# Patient Record
Sex: MALE | Race: WHITE
[De-identification: names, ages, dates, MRNs, and addresses within clinical notes are randomized per-mention and may not be internally consistent; named-entity substitution may affect disease eponyms.]

---

## 2021-07-16 ENCOUNTER — HOSPITAL ENCOUNTER (EMERGENCY)
Dept: HOSPITAL 7 - FB.ED | Age: 76
Discharge: NURSING, REHAB OR LONG-TERM CARE | End: 2021-07-16
Payer: OTHER GOVERNMENT

## 2021-07-16 DIAGNOSIS — J18.9: ICD-10-CM

## 2021-07-16 DIAGNOSIS — A41.9: Primary | ICD-10-CM

## 2021-07-16 DIAGNOSIS — I25.10: ICD-10-CM

## 2021-07-16 DIAGNOSIS — E11.9: ICD-10-CM

## 2021-07-16 DIAGNOSIS — Z79.84: ICD-10-CM

## 2021-07-16 DIAGNOSIS — N17.9: ICD-10-CM

## 2021-07-16 LAB
HCO3 BLDV-SCNC: 27 MMOL/L (ref 21–29)
PCO2 BLDV: 52 MMHG (ref 41–51)
PH BLDV: 7.32 PH UNITS (ref 7.32–7.43)

## 2021-07-16 PROCEDURE — U0002 COVID-19 LAB TEST NON-CDC: HCPCS

## 2021-07-16 PROCEDURE — 96375 TX/PRO/DX INJ NEW DRUG ADDON: CPT

## 2021-07-16 PROCEDURE — 96365 THER/PROPH/DIAG IV INF INIT: CPT

## 2021-07-16 PROCEDURE — 85025 COMPLETE CBC W/AUTO DIFF WBC: CPT

## 2021-07-16 PROCEDURE — 87040 BLOOD CULTURE FOR BACTERIA: CPT

## 2021-07-16 PROCEDURE — 51702 INSERT TEMP BLADDER CATH: CPT

## 2021-07-16 PROCEDURE — 83605 ASSAY OF LACTIC ACID: CPT

## 2021-07-16 PROCEDURE — 93005 ELECTROCARDIOGRAM TRACING: CPT

## 2021-07-16 PROCEDURE — 86140 C-REACTIVE PROTEIN: CPT

## 2021-07-16 PROCEDURE — 99285 EMERGENCY DEPT VISIT HI MDM: CPT

## 2021-07-16 PROCEDURE — 80053 COMPREHEN METABOLIC PANEL: CPT

## 2021-07-16 PROCEDURE — 71045 X-RAY EXAM CHEST 1 VIEW: CPT

## 2021-07-16 PROCEDURE — 36415 COLL VENOUS BLD VENIPUNCTURE: CPT

## 2021-07-16 PROCEDURE — 87635 SARS-COV-2 COVID-19 AMP PRB: CPT

## 2021-07-16 PROCEDURE — 83880 ASSAY OF NATRIURETIC PEPTIDE: CPT

## 2021-07-16 PROCEDURE — 82947 ASSAY GLUCOSE BLOOD QUANT: CPT

## 2021-07-16 PROCEDURE — 84484 ASSAY OF TROPONIN QUANT: CPT

## 2021-07-16 PROCEDURE — 81001 URINALYSIS AUTO W/SCOPE: CPT

## 2021-07-16 NOTE — EDM.PDOC
ED HPI GENERAL MEDICAL PROBLEM





- General


Stated Complaint: DIFFICULTY BREATHING


Time Seen by Provider: 07/16/21 15:25


Source of Information: Reports: Patient, Family


History Limitations: Reports: Altered Mental Status





- History of Present Illness


INITIAL COMMENTS - FREE TEXT/NARRATIVE: 





Patient is a 74 YO WM who presented to the ED via EMS because of coughing, 

dyspnea and weakness. He has been coughing for 1 month now which is productive 

of whitish phlegm. The dyspnea was just for the past 3-4 days and is getting 

worse. He was hypoxemic  with O2S of 87% on RA. He denies having any chest pain,

nausea, vomiting. There is no fever, chills, changes in bowel movements or 

urinary symptoms.





- Related Data


                                    Allergies











Allergy/AdvReac Type Severity Reaction Status Date / Time


 


No Known Allergies Allergy   Verified 07/16/21 17:33











Home Meds: 


                                    Home Meds





glipiZIDE [Glucotrol XL] 1 tab PO DAILY 07/16/21 [History]


metFORMIN [Glucophage] 1 tab PO DAILY 07/16/21 [History]











ED ROS GENERAL





- Review of Systems


Review Of Systems: See Below


Constitutional: Reports: Weakness


HEENT: Reports: No Symptoms


Respiratory: Reports: Shortness of Breath, Cough


Cardiovascular: Reports: No Symptoms, Chest Pain


Endocrine: Reports: Low Glucose


GI/Abdominal: Reports: No Symptoms


: Reports: No Symptoms


Musculoskeletal: Reports: No Symptoms


Skin: Reports: No Symptoms


Neurological: Reports: Confusion


Psychiatric: Reports: No Symptoms


Hematologic/Lymphatic: Reports: No Symptoms





ED EXAM, GENERAL





- Physical Exam


Exam: See Below


Exam Limited By: No Limitations


General Appearance: Other (comfused)


Ears: Normal External Exam, Normal Canal


Nose: Normal Inspection, Normal Mucosa, No Blood


Throat/Mouth: Normal Inspection, Normal Lips, Normal Teeth


Head: Atraumatic, Normocephalic


Neck: Normal Inspection, Supple, Non-Tender, Full Range of Motion


Respiratory/Chest: Decreased Breath Sounds, Rhonchi


Cardiovascular: Normal Peripheral Pulses, Regular Rate, Rhythm, No Edema, No 

JVD, No Murmur


GI/Abdominal: Normal Bowel Sounds, Soft, Non-Tender, No Organomegaly


Extremities: Normal Inspection, Normal Range of Motion, Non-Tender, No Pedal 

Edema


Neurological: Alert, Oriented, CN II-XII Intact, Normal Cognition


Skin Exam: Warm


  ** #1 Interpretation


EKG Date: 07/16/21


Time: 15:24


Rhythm: NSR


Rate (Beats/Min): 96


Axis: Normal


P-Wave: Present


QRS: Normal


ST-T: Normal


QT: Normal


Comparison: NA - No Prior EKG


EKG Interpretation Comments: 





NSR


PAC's





Course





- Vital Signs


Text/Narrative:: 





Lab/EKG/CXR result was reviewed and discussed with patient


NS 1 L in 2 hours


O2 VNC


D50 1 amp IV x1


Zosyn 4.5 gm IV


Code Status: Full code


Case discussed with Dr Cardona who agreed with athe above plan of care





- Orders/Labs/Meds


Orders: 


                               Active Orders 24 hr











 Category Date Time Status


 


 EKG Documentation Completion [RC] ASDIRECTED Care  07/16/21 16:02 Active


 


 Longoria Catheter Insertion [Insert Urinary Catheter] [OM. Care  07/16/21 17:15 

Ordered





 PC] Q24H   


 


 Urinary Catheter Assessment [RC] QSHIFT Care  07/16/21 17:12 Active


 


 Chest 1V Frontal [CR] Stat Exams  07/16/21 16:00 Taken


 


 CORONAVIRUS COVID-19 JEFFREY [MOLEC] Stat Lab  07/16/21 17:26 Received


 


 CULTURE BLOOD [BC] Urgent Lab  07/16/21 16:20 Received


 


 CULTURE BLOOD [BC] Urgent Lab  07/16/21 16:30 Received


 


 Sodium Chloride 0.9% [Normal Saline] 1,000 ml Med  07/16/21 16:15 Active





 IV ASDIRECTED   


 


 Sodium Chloride 0.9% [Saline Flush] Med  07/16/21 16:00 Active





 10 ml FLUSH ASDIRECTED PRN   


 


 Blood Culture x2 Reflex Set [OM.PC] Urgent Oth  07/16/21 16:00 Ordered


 


 Saline Lock Insert [OM.PC] Routine Oth  07/16/21 16:00 Ordered


 


 EKG 12 Lead [EK] Routine Ther  07/16/21 16:00 Ordered








                                Medication Orders





Sodium Chloride (Normal Saline)  1,000 mls @ 500 mls/hr IV ASDIRECTED MIGDALIA


Sodium Chloride (Sodium Chloride 0.9% 10 Ml Syringe)  10 ml FLUSH ASDIRECTED PRN


   PRN Reason: Keep Vein Open








Labs: 


                                Laboratory Tests











  07/16/21 07/16/21 07/16/21 Range/Units





  15:58 16:20 16:20 


 


WBC   10.4 H   (3.2-10.1)  x10-3/uL


 


RBC   4.83   (3.90-5.90)  x10(6)uL


 


Hgb   15.5   (12.9-17.7)  g/dL


 


Hct   48.4   (38.3-50.1)  %


 


MCV   100.2 H   (80.8-98.7)  fL


 


MCH   32.1   (27.0-33.3)  pg


 


MCHC   32.1   (28.7-35.3)  g/dL


 


RDW   16.0 H   (12.4-15.0)  %


 


Plt Count   128   (117-477)  x10(3)uL


 


MPV   9.0   (6.7-11.0)  fL


 


Neut % (Auto)   75.6 H   (40.3-71.8)  %


 


Lymph % (Auto)   15.2 L   (15.8-45.3)  %


 


Mono % (Auto)   9.1   (5.5-15.2)  %


 


Eos % (Auto)   0.0 L   (0.1-6.8)  %


 


Baso % (Auto)   0.1 L   (0.3-3.8)  %


 


Neut # (Auto)   7.9 H   (1.7-6.9)  x10-3/uL


 


Lymph # (Auto)   1.6   (0.5-4.5)  x10-3/uL


 


Mono # (Auto)   0.9   (0.0-1.2)  x10-3/uL


 


Eos # (Auto)   0.0   (0.0-0.6)  x10-3/uL


 


Baso # (Auto)   0.0   (0.0-0.3)  x10-3/uL


 


POC VBG pH     (7.32-7.43)  pH Units


 


POC VBG pCO2     (41-51)  mmHg


 


POC VBG HCO3     (21-29)  mmol/L


 


VBG Base Excess     (-2-3)  mmol/L


 


O2 Delivery Device     


 


Sodium    141  (135-145)  mmol/L


 


Potassium    5.0  (3.5-5.3)  mmol/L


 


Chloride    98 L  (100-110)  mmol/L


 


Carbon Dioxide    29  (21-32)  mmol/L


 


BUN    103 H  (7-18)  mg/dL


 


Creatinine    5.4 H*  (0.70-1.30)  mg/dL


 


Est Cr Clr Drug Dosing    TNP  


 


Estimated GFR (MDRD)    10 L  (>60)  


 


BUN/Creatinine Ratio    19.1  (9-20)  


 


Glucose    157 H  ()  mg/dL


 


POC Glucose  58 L    ()  mg/dL


 


Lactic Acid     (0.4-2.0)  mmol/L


 


Calcium    8.6  (8.6-10.2)  mg/dL


 


Total Bilirubin    1.2  (0.1-1.3)  mg/dL


 


AST    29 H  (5-25)  IU/L


 


ALT    40 H  (12-36)  U/L


 


Alkaline Phosphatase    73  ()  IU/L


 


Troponin I     (4.0-60.3)  pg/mL


 


C-Reactive Protein     (0.5-0.9)  mg/dL


 


NT-Pro-B Natriuret Pep     (<=125)  pg/mL


 


Total Protein    7.1  (6.0-8.0)  g/dL


 


Albumin    2.7 L  (3.2-4.6)  g/dL


 


Globulin    4.4  g/dL


 


Albumin/Globulin Ratio    0.6  


 


Urine Color     (YELLOW)  


 


Urine Appearance     (CLEAR)  


 


Urine pH     (5.0-6.5)  


 


Ur Specific Gravity     (1.010-1.025)  


 


Urine Protein     (NEGATIVE)  mg/dL


 


Urine Glucose (UA)     (NORMAL)  mg/dL


 


Urine Ketones     (NEGATIVE)  mg/dL


 


Urine Occult Blood     (NEGATIVE)  


 


Urine Nitrite     (NEGATIVE)  


 


Urine Bilirubin     (NEGATIVE)  


 


Urine Urobilinogen     (NEGATIVE)  mg/dL


 


Ur Leukocyte Esterase     (NEGATIVE)  


 


U Hyaline Cast (Auto)     (NS)  


 


Urine RBC     (0-5)  


 


Urine WBC     (0-5)  


 


Ur Squamous Epith Cells     (NS,R,O)  


 


Urine Bacteria     (NS)  














  07/16/21 07/16/21 07/16/21 Range/Units





  16:20 16:20 16:20 


 


WBC     (3.2-10.1)  x10-3/uL


 


RBC     (3.90-5.90)  x10(6)uL


 


Hgb     (12.9-17.7)  g/dL


 


Hct     (38.3-50.1)  %


 


MCV     (80.8-98.7)  fL


 


MCH     (27.0-33.3)  pg


 


MCHC     (28.7-35.3)  g/dL


 


RDW     (12.4-15.0)  %


 


Plt Count     (117-477)  x10(3)uL


 


MPV     (6.7-11.0)  fL


 


Neut % (Auto)     (40.3-71.8)  %


 


Lymph % (Auto)     (15.8-45.3)  %


 


Mono % (Auto)     (5.5-15.2)  %


 


Eos % (Auto)     (0.1-6.8)  %


 


Baso % (Auto)     (0.3-3.8)  %


 


Neut # (Auto)     (1.7-6.9)  x10-3/uL


 


Lymph # (Auto)     (0.5-4.5)  x10-3/uL


 


Mono # (Auto)     (0.0-1.2)  x10-3/uL


 


Eos # (Auto)     (0.0-0.6)  x10-3/uL


 


Baso # (Auto)     (0.0-0.3)  x10-3/uL


 


POC VBG pH    7.32  (7.32-7.43)  pH Units


 


POC VBG pCO2    52 H  (41-51)  mmHg


 


POC VBG HCO3    27  (21-29)  mmol/L


 


VBG Base Excess    -0  (-2-3)  mmol/L


 


O2 Delivery Device    Nasal cannula  


 


Sodium     (135-145)  mmol/L


 


Potassium     (3.5-5.3)  mmol/L


 


Chloride     (100-110)  mmol/L


 


Carbon Dioxide     (21-32)  mmol/L


 


BUN     (7-18)  mg/dL


 


Creatinine     (0.70-1.30)  mg/dL


 


Est Cr Clr Drug Dosing     


 


Estimated GFR (MDRD)     (>60)  


 


BUN/Creatinine Ratio     (9-20)  


 


Glucose     ()  mg/dL


 


POC Glucose     ()  mg/dL


 


Lactic Acid   5.4 H*   (0.4-2.0)  mmol/L


 


Calcium     (8.6-10.2)  mg/dL


 


Total Bilirubin     (0.1-1.3)  mg/dL


 


AST     (5-25)  IU/L


 


ALT     (12-36)  U/L


 


Alkaline Phosphatase     ()  IU/L


 


Troponin I  640.5 H*    (4.0-60.3)  pg/mL


 


C-Reactive Protein  8.3 H*    (0.5-0.9)  mg/dL


 


NT-Pro-B Natriuret Pep  46483 H*    (<=125)  pg/mL


 


Total Protein     (6.0-8.0)  g/dL


 


Albumin     (3.2-4.6)  g/dL


 


Globulin     g/dL


 


Albumin/Globulin Ratio     


 


Urine Color     (YELLOW)  


 


Urine Appearance     (CLEAR)  


 


Urine pH     (5.0-6.5)  


 


Ur Specific Gravity     (1.010-1.025)  


 


Urine Protein     (NEGATIVE)  mg/dL


 


Urine Glucose (UA)     (NORMAL)  mg/dL


 


Urine Ketones     (NEGATIVE)  mg/dL


 


Urine Occult Blood     (NEGATIVE)  


 


Urine Nitrite     (NEGATIVE)  


 


Urine Bilirubin     (NEGATIVE)  


 


Urine Urobilinogen     (NEGATIVE)  mg/dL


 


Ur Leukocyte Esterase     (NEGATIVE)  


 


U Hyaline Cast (Auto)     (NS)  


 


Urine RBC     (0-5)  


 


Urine WBC     (0-5)  


 


Ur Squamous Epith Cells     (NS,R,O)  


 


Urine Bacteria     (NS)  














  07/16/21 07/16/21 Range/Units





  17:20 17:22 


 


WBC    (3.2-10.1)  x10-3/uL


 


RBC    (3.90-5.90)  x10(6)uL


 


Hgb    (12.9-17.7)  g/dL


 


Hct    (38.3-50.1)  %


 


MCV    (80.8-98.7)  fL


 


MCH    (27.0-33.3)  pg


 


MCHC    (28.7-35.3)  g/dL


 


RDW    (12.4-15.0)  %


 


Plt Count    (117-477)  x10(3)uL


 


MPV    (6.7-11.0)  fL


 


Neut % (Auto)    (40.3-71.8)  %


 


Lymph % (Auto)    (15.8-45.3)  %


 


Mono % (Auto)    (5.5-15.2)  %


 


Eos % (Auto)    (0.1-6.8)  %


 


Baso % (Auto)    (0.3-3.8)  %


 


Neut # (Auto)    (1.7-6.9)  x10-3/uL


 


Lymph # (Auto)    (0.5-4.5)  x10-3/uL


 


Mono # (Auto)    (0.0-1.2)  x10-3/uL


 


Eos # (Auto)    (0.0-0.6)  x10-3/uL


 


Baso # (Auto)    (0.0-0.3)  x10-3/uL


 


POC VBG pH    (7.32-7.43)  pH Units


 


POC VBG pCO2    (41-51)  mmHg


 


POC VBG HCO3    (21-29)  mmol/L


 


VBG Base Excess    (-2-3)  mmol/L


 


O2 Delivery Device    


 


Sodium    (135-145)  mmol/L


 


Potassium    (3.5-5.3)  mmol/L


 


Chloride    (100-110)  mmol/L


 


Carbon Dioxide    (21-32)  mmol/L


 


BUN    (7-18)  mg/dL


 


Creatinine    (0.70-1.30)  mg/dL


 


Est Cr Clr Drug Dosing    


 


Estimated GFR (MDRD)    (>60)  


 


BUN/Creatinine Ratio    (9-20)  


 


Glucose    ()  mg/dL


 


POC Glucose   113  ()  mg/dL


 


Lactic Acid    (0.4-2.0)  mmol/L


 


Calcium    (8.6-10.2)  mg/dL


 


Total Bilirubin    (0.1-1.3)  mg/dL


 


AST    (5-25)  IU/L


 


ALT    (12-36)  U/L


 


Alkaline Phosphatase    ()  IU/L


 


Troponin I    (4.0-60.3)  pg/mL


 


C-Reactive Protein    (0.5-0.9)  mg/dL


 


NT-Pro-B Natriuret Pep    (<=125)  pg/mL


 


Total Protein    (6.0-8.0)  g/dL


 


Albumin    (3.2-4.6)  g/dL


 


Globulin    g/dL


 


Albumin/Globulin Ratio    


 


Urine Color  Orange   (YELLOW)  


 


Urine Appearance  Clear   (CLEAR)  


 


Urine pH  5.0   (5.0-6.5)  


 


Ur Specific Gravity  1.025   (1.010-1.025)  


 


Urine Protein  Trace   (NEGATIVE)  mg/dL


 


Urine Glucose (UA)  Normal   (NORMAL)  mg/dL


 


Urine Ketones  Negative   (NEGATIVE)  mg/dL


 


Urine Occult Blood  Trace   (NEGATIVE)  


 


Urine Nitrite  Negative   (NEGATIVE)  


 


Urine Bilirubin  Small H   (NEGATIVE)  


 


Urine Urobilinogen  Normal   (NEGATIVE)  mg/dL


 


Ur Leukocyte Esterase  Negative   (NEGATIVE)  


 


U Hyaline Cast (Auto)  Few H   (NS)  


 


Urine RBC  0-5   (0-5)  


 


Urine WBC  0-5   (0-5)  


 


Ur Squamous Epith Cells  Few H   (NS,R,O)  


 


Urine Bacteria  Few H   (NS)  











Meds: 


Medications











Generic Name Dose Route Start Last Admin





  Trade Name Freq  PRN Reason Stop Dose Admin


 


Sodium Chloride  1,000 mls @ 500 mls/hr  07/16/21 16:15 





  Normal Saline  IV  





  ASDIRECTED MIGDALIA  


 


Sodium Chloride  10 ml  07/16/21 16:00 





  Sodium Chloride 0.9% 10 Ml Syringe  FLUSH  





  ASDIRECTED PRN  





  Keep Vein Open  














Discontinued Medications














Generic Name Dose Route Start Last Admin





  Trade Name Brianne  PRN Reason Stop Dose Admin


 


Dextrose/Water  50 ml  07/16/21 16:03 





  50% Dextrose In Water 50 Ml Syringe  IVPUSH  07/16/21 16:04 





  NOW STA  


 


Piperacillin Sod/Tazobactam  100 mls @ 200 mls/hr  07/16/21 17:06 





  Sod 4.5 gm/ Sodium Chloride  IV  07/16/21 17:35 





  NOW STA  














Departure





- Departure


Time of Disposition: 17:00


Disposition: DC/[facility]fer to [facility]


Condition: Good


Clinical Impression: 


 Sepsis, Pneumonia, Dehydration, CARMEN (acute kidney injury), Acute coronary 

syndrome, Diabetes mellitus








- Discharge Information


Referrals: 


PCP,None [Primary Care Provider] - 





- My Orders


Last 24 Hours: 


My Active Orders





07/16/21 16:00


Chest 1V Frontal [CR] Stat 


Sodium Chloride 0.9% [Saline Flush]   10 ml FLUSH ASDIRECTED PRN 


Blood Culture x2 Reflex Set [OM.PC] Urgent 


Saline Lock Insert [OM.PC] Routine 


EKG 12 Lead [EK] Routine 





07/16/21 16:02


EKG Documentation Completion [RC] ASDIRECTED 





07/16/21 16:15


Sodium Chloride 0.9% [Normal Saline] 1,000 ml IV ASDIRECTED 





07/16/21 16:20


CULTURE BLOOD [BC] Urgent 





07/16/21 16:30


CULTURE BLOOD [BC] Urgent 





07/16/21 17:12


Urinary Catheter Assessment [RC] QSHIFT 





07/16/21 17:15


Longoria Catheter Insertion [Insert Urinary Catheter] [OM.PC] Q24H 





07/16/21 17:26


CORONAVIRUS COVID-19 JEFFREY [MOLEC] Stat 














- Assessment/Plan


Last 24 Hours: 


My Active Orders





07/16/21 16:00


Chest 1V Frontal [CR] Stat 


Sodium Chloride 0.9% [Saline Flush]   10 ml FLUSH ASDIRECTED PRN 


Blood Culture x2 Reflex Set [OM.PC] Urgent 


Saline Lock Insert [OM.PC] Routine 


EKG 12 Lead [EK] Routine 





07/16/21 16:02


EKG Documentation Completion [RC] ASDIRECTED 





07/16/21 16:15


Sodium Chloride 0.9% [Normal Saline] 1,000 ml IV ASDIRECTED 





07/16/21 16:20


CULTURE BLOOD [BC] Urgent 





07/16/21 16:30


CULTURE BLOOD [BC] Urgent 





07/16/21 17:12


Urinary Catheter Assessment [RC] QSHIFT 





07/16/21 17:15


Longoria Catheter Insertion [Insert Urinary Catheter] [OM.PC] Q24H 





07/16/21 17:26


CORONAVIRUS COVID-19 JEFFREY [MOLEC] Stat